# Patient Record
Sex: FEMALE | Race: WHITE | URBAN - METROPOLITAN AREA
[De-identification: names, ages, dates, MRNs, and addresses within clinical notes are randomized per-mention and may not be internally consistent; named-entity substitution may affect disease eponyms.]

---

## 2022-06-29 ENCOUNTER — APPOINTMENT (RX ONLY)
Dept: URBAN - METROPOLITAN AREA CLINIC 331 | Facility: CLINIC | Age: 67
Setting detail: DERMATOLOGY
End: 2022-06-29

## 2022-06-29 PROBLEM — D48.5 NEOPLASM OF UNCERTAIN BEHAVIOR OF SKIN: Status: ACTIVE | Noted: 2022-06-29

## 2022-06-29 PROCEDURE — ? BIOPSY BY SHAVE METHOD

## 2022-06-29 PROCEDURE — 11102 TANGNTL BX SKIN SINGLE LES: CPT

## 2022-07-29 ENCOUNTER — APPOINTMENT (RX ONLY)
Dept: URBAN - METROPOLITAN AREA CLINIC 331 | Facility: CLINIC | Age: 67
Setting detail: DERMATOLOGY
End: 2022-07-29

## 2022-07-29 PROBLEM — C44.311 BASAL CELL CARCINOMA OF SKIN OF NOSE: Status: ACTIVE | Noted: 2022-07-29

## 2022-07-29 PROCEDURE — ? PRESCRIPTION

## 2022-07-29 PROCEDURE — 17311 MOHS 1 STAGE H/N/HF/G: CPT

## 2022-07-29 PROCEDURE — ? MOHS SURGERY

## 2022-07-29 RX ORDER — MINOCYCLINE HYDROCHLORIDE 100 MG/1
CAPSULE ORAL
Qty: 7 | Refills: 0 | Status: ERX | COMMUNITY
Start: 2022-07-29

## 2022-07-29 RX ORDER — MUPIROCIN 20 MG/G
OINTMENT TOPICAL BID
Qty: 22 | Refills: 1 | Status: ERX | COMMUNITY
Start: 2022-07-29

## 2022-07-29 RX ADMIN — MINOCYCLINE HYDROCHLORIDE: 100 CAPSULE ORAL at 00:00

## 2022-07-29 RX ADMIN — MUPIROCIN: 20 OINTMENT TOPICAL at 00:00

## 2022-07-29 NOTE — PROCEDURE: MOHS SURGERY

## 2022-07-29 NOTE — PROCEDURE: MOHS SURGERY
We will have patient begin Singulair 10 mg at bedtime.  Recommend having chest x-ray done.  Referral to allergist.  Continue antihistamine of choice.  Decongestant of choice.  I am avoiding steroids as she is just completed a course.  Pt verbalizes understanding and agreement with plan of care.    Bi-Rhombic Flap Text: The defect edges were debeveled with a #15 scalpel blade.  Given the location of the defect and the proximity to free margins a bi-rhombic flap was deemed most appropriate.  Using a sterile surgical marker, an appropriate rhombic flap was drawn incorporating the defect. The area thus outlined was incised deep to adipose tissue with a #15 scalpel blade.  The skin margins were undermined to an appropriate distance in all directions utilizing iris scissors.

## 2022-07-29 NOTE — PROCEDURE: MOHS SURGERY
Fall precautions/Explanation of exam/test Cheiloplasty (Complex) Text: A decision was made to reconstruct the defect with a  cheiloplasty.  The defect was undermined extensively.  Additional orbicularis oris muscle was excised with a 15 blade scalpel.  The defect was converted into a full thickness wedge to facilite a better cosmetic result.  Small vessels were then tied off with 5-0 monocyrl. The orbicularis oris, superficial fascia, adipose and dermis were then reapproximated.  After the deeper layers were approximated the epidermis was reapproximated with particular care given to realign the vermilion border.

## 2022-08-10 ENCOUNTER — APPOINTMENT (RX ONLY)
Dept: URBAN - METROPOLITAN AREA CLINIC 331 | Facility: CLINIC | Age: 67
Setting detail: DERMATOLOGY
End: 2022-08-10

## 2022-08-10 DIAGNOSIS — R21 RASH AND OTHER NONSPECIFIC SKIN ERUPTION: ICD-10-CM

## 2022-08-10 DIAGNOSIS — D22 MELANOCYTIC NEVI: ICD-10-CM

## 2022-08-10 DIAGNOSIS — F42.4 EXCORIATION (SKIN-PICKING) DISORDER: ICD-10-CM

## 2022-08-10 DIAGNOSIS — L20.89 OTHER ATOPIC DERMATITIS: ICD-10-CM | Status: INADEQUATELY CONTROLLED

## 2022-08-10 DIAGNOSIS — L81.4 OTHER MELANIN HYPERPIGMENTATION: ICD-10-CM

## 2022-08-10 DIAGNOSIS — L82.1 OTHER SEBORRHEIC KERATOSIS: ICD-10-CM

## 2022-08-10 DIAGNOSIS — Z85.828 PERSONAL HISTORY OF OTHER MALIGNANT NEOPLASM OF SKIN: ICD-10-CM

## 2022-08-10 PROBLEM — D48.5 NEOPLASM OF UNCERTAIN BEHAVIOR OF SKIN: Status: ACTIVE | Noted: 2022-08-10

## 2022-08-10 PROBLEM — D22.5 MELANOCYTIC NEVI OF TRUNK: Status: ACTIVE | Noted: 2022-08-10

## 2022-08-10 PROBLEM — T14.8XXA OTHER INJURY OF UNSPECIFIED BODY REGION, INITIAL ENCOUNTER: Status: ACTIVE | Noted: 2022-08-10

## 2022-08-10 PROCEDURE — 99214 OFFICE O/P EST MOD 30 MIN: CPT | Mod: 25

## 2022-08-10 PROCEDURE — ? COUNSELING

## 2022-08-10 PROCEDURE — 11104 PUNCH BX SKIN SINGLE LESION: CPT

## 2022-08-10 PROCEDURE — ? TREATMENT REGIMEN

## 2022-08-10 PROCEDURE — ? PRESCRIPTION

## 2022-08-10 PROCEDURE — ? BIOPSY BY SHAVE METHOD

## 2022-08-10 PROCEDURE — 11103 TANGNTL BX SKIN EA SEP/ADDL: CPT

## 2022-08-10 PROCEDURE — ? BIOPSY BY PUNCH METHOD

## 2022-08-10 PROCEDURE — ? FULL BODY SKIN EXAM

## 2022-08-10 RX ORDER — TRIAMCINOLONE ACETONIDE 1 MG/G
CREAM TOPICAL BID
Qty: 454 | Refills: 3 | Status: CANCELLED | COMMUNITY
Start: 2022-08-10

## 2022-08-10 RX ADMIN — TRIAMCINOLONE ACETONIDE: 1 CREAM TOPICAL at 00:00

## 2022-08-10 ASSESSMENT — LOCATION ZONE DERM
LOCATION ZONE: TRUNK
LOCATION ZONE: FACE
LOCATION ZONE: FEET

## 2022-08-10 ASSESSMENT — LOCATION DETAILED DESCRIPTION DERM
LOCATION DETAILED: INFERIOR THORACIC SPINE
LOCATION DETAILED: RIGHT SUPERIOR MEDIAL MALAR CHEEK
LOCATION DETAILED: RIGHT SUPERIOR MEDIAL UPPER BACK
LOCATION DETAILED: LEFT DORSAL FOOT

## 2022-08-10 ASSESSMENT — LOCATION SIMPLE DESCRIPTION DERM
LOCATION SIMPLE: RIGHT UPPER BACK
LOCATION SIMPLE: LEFT FOOT
LOCATION SIMPLE: UPPER BACK
LOCATION SIMPLE: RIGHT CHEEK

## 2022-08-10 NOTE — PROCEDURE: COUNSELING
Detail Level: Generalized
Detail Level: Zone
Detail Level: Detailed
Patient Specific Counseling (Will Not Stick From Patient To Patient): Samples eucrisa given to Guest

## 2022-08-10 NOTE — PROCEDURE: BIOPSY BY PUNCH METHOD
Detail Level: Detailed
Was A Bandage Applied: Yes
Punch Size In Mm: 3
Biopsy Type: H and E
Anesthesia Type: 1% lidocaine with epinephrine
Anesthesia Volume In Cc (Will Not Render If 0): 0.5
Additional Anesthesia Volume In Cc (Will Not Render If 0): 0
Hemostasis: Gelfoam
Epidermal Sutures: 4-0 Ethilon
Wound Care: Petrolatum
Dressing: bandage
Patient Will Remove Sutures At Home?: No
Consent: Written consent was obtained and risks were reviewed including but not limited to scarring, infection, bleeding, scabbing, incomplete removal, nerve damage and allergy to anesthesia.
Post-Care Instructions: I reviewed with the patient in detail post-care instructions. Patient is to keep the biopsy site dry overnight, and then apply bacitracin twice daily until healed. Patient may apply hydrogen peroxide soaks to remove any crusting.
Home Suture Removal Text: Patient was provided a home suture removal kit and will remove their sutures at home.  If they have any questions or difficulties they will call the office.
Notification Instructions: Patient will be notified of biopsy results. However, patient instructed to call the office if not contacted within 2 weeks.
Billing Type: Third-Party Bill
Information: Selecting Yes will display possible errors in your note based on the variables you have selected. This validation is only offered as a suggestion for you. PLEASE NOTE THAT THE VALIDATION TEXT WILL BE REMOVED WHEN YOU FINALIZE YOUR NOTE. IF YOU WANT TO FAX A PRELIMINARY NOTE YOU WILL NEED TO TOGGLE THIS TO 'NO' IF YOU DO NOT WANT IT IN YOUR FAXED NOTE.

## 2022-08-10 NOTE — HPI: EVALUATION OF SKIN LESION(S)
What Type Of Note Output Would You Prefer (Optional)?: Bullet Format
Hpi Title: Evaluation of Skin Lesions
Additional History: \\n\\nPatient reports spot on upper back that is new and concerning.

## 2022-08-31 ENCOUNTER — APPOINTMENT (RX ONLY)
Dept: URBAN - METROPOLITAN AREA CLINIC 331 | Facility: CLINIC | Age: 67
Setting detail: DERMATOLOGY
End: 2022-08-31

## 2022-08-31 DIAGNOSIS — L20.89 OTHER ATOPIC DERMATITIS: ICD-10-CM

## 2022-08-31 DIAGNOSIS — L82.0 INFLAMED SEBORRHEIC KERATOSIS: ICD-10-CM

## 2022-08-31 DIAGNOSIS — L20.89 OTHER ATOPIC DERMATITIS: ICD-10-CM | Status: INADEQUATELY CONTROLLED

## 2022-08-31 DIAGNOSIS — F42.4 EXCORIATION (SKIN-PICKING) DISORDER: ICD-10-CM

## 2022-08-31 PROBLEM — T14.8XXA OTHER INJURY OF UNSPECIFIED BODY REGION, INITIAL ENCOUNTER: Status: ACTIVE | Noted: 2022-08-31

## 2022-08-31 PROBLEM — L20.84 INTRINSIC (ALLERGIC) ECZEMA: Status: ACTIVE | Noted: 2022-08-31

## 2022-08-31 PROCEDURE — ? PATHOLOGY DISCUSSION

## 2022-08-31 PROCEDURE — 99214 OFFICE O/P EST MOD 30 MIN: CPT

## 2022-08-31 PROCEDURE — ? COUNSELING

## 2022-08-31 PROCEDURE — ? PRESCRIPTION MEDICATION MANAGEMENT

## 2022-08-31 PROCEDURE — ? PRESCRIPTION

## 2022-08-31 RX ORDER — PHARMACY COMPOUNDING ACCESSORY
EACH MISCELLANEOUS
Qty: 60 | Refills: 3 | Status: ERX | COMMUNITY
Start: 2022-08-31

## 2022-08-31 RX ORDER — TRIAMCINOLONE ACETONIDE 1 MG/G
CREAM TOPICAL BID
Qty: 454 | Refills: 3 | Status: ACTIVE

## 2022-08-31 RX ORDER — PIMECROLIMUS 10 MG/G
CREAM TOPICAL
Qty: 60 | Refills: 3 | Status: ERX | COMMUNITY
Start: 2022-08-31

## 2022-08-31 RX ADMIN — PIMECROLIMUS: 10 CREAM TOPICAL at 00:00

## 2022-08-31 RX ADMIN — Medication: at 00:00

## 2022-08-31 ASSESSMENT — LOCATION ZONE DERM
LOCATION ZONE: EYELID
LOCATION ZONE: FEET
LOCATION ZONE: TRUNK

## 2022-08-31 ASSESSMENT — LOCATION SIMPLE DESCRIPTION DERM
LOCATION SIMPLE: RIGHT UPPER BACK
LOCATION SIMPLE: RIGHT FOOT
LOCATION SIMPLE: LEFT EYELID
LOCATION SIMPLE: LEFT FOOT

## 2022-08-31 ASSESSMENT — LOCATION DETAILED DESCRIPTION DERM
LOCATION DETAILED: RIGHT DORSAL FOOT
LOCATION DETAILED: RIGHT SUPERIOR MEDIAL UPPER BACK
LOCATION DETAILED: LEFT LATERAL CANTHUS
LOCATION DETAILED: LEFT DORSAL FOOT

## 2022-09-01 ENCOUNTER — RX ONLY (OUTPATIENT)
Age: 67
Setting detail: RX ONLY
End: 2022-09-01

## 2022-09-01 RX ORDER — TACROLIMUS 1 MG/G
OINTMENT TOPICAL
Qty: 30 | Refills: 3 | Status: ERX | COMMUNITY
Start: 2022-09-01

## 2022-09-22 ENCOUNTER — APPOINTMENT (RX ONLY)
Dept: URBAN - METROPOLITAN AREA CLINIC 331 | Facility: CLINIC | Age: 67
Setting detail: DERMATOLOGY
End: 2022-09-22

## 2022-09-22 DIAGNOSIS — K13.0 DISEASES OF LIPS: ICD-10-CM

## 2022-09-22 DIAGNOSIS — L20.89 OTHER ATOPIC DERMATITIS: ICD-10-CM

## 2022-09-22 PROBLEM — D48.5 NEOPLASM OF UNCERTAIN BEHAVIOR OF SKIN: Status: ACTIVE | Noted: 2022-09-22

## 2022-09-22 PROBLEM — L20.84 INTRINSIC (ALLERGIC) ECZEMA: Status: ACTIVE | Noted: 2022-09-22

## 2022-09-22 PROCEDURE — 11102 TANGNTL BX SKIN SINGLE LES: CPT

## 2022-09-22 PROCEDURE — ? PRESCRIPTION

## 2022-09-22 PROCEDURE — 99213 OFFICE O/P EST LOW 20 MIN: CPT | Mod: 25

## 2022-09-22 PROCEDURE — ? PRESCRIPTION MEDICATION MANAGEMENT

## 2022-09-22 PROCEDURE — ? COUNSELING

## 2022-09-22 PROCEDURE — ? BIOPSY BY SHAVE METHOD

## 2022-09-22 RX ORDER — DESONIDE 0.5 MG/G
OINTMENT TOPICAL
Qty: 60 | Refills: 3 | Status: ERX | COMMUNITY
Start: 2022-09-22

## 2022-09-22 RX ADMIN — DESONIDE: 0.5 OINTMENT TOPICAL at 00:00

## 2022-09-22 ASSESSMENT — LOCATION ZONE DERM
LOCATION ZONE: LIP
LOCATION ZONE: FEET

## 2022-09-22 ASSESSMENT — LOCATION SIMPLE DESCRIPTION DERM
LOCATION SIMPLE: RIGHT FOOT
LOCATION SIMPLE: LEFT LIP
LOCATION SIMPLE: LEFT FOOT

## 2022-09-22 ASSESSMENT — LOCATION DETAILED DESCRIPTION DERM
LOCATION DETAILED: RIGHT DORSAL FOOT
LOCATION DETAILED: LEFT DORSAL FOOT
LOCATION DETAILED: LEFT LOWER CUTANEOUS LIP

## 2022-09-22 NOTE — PROCEDURE: PRESCRIPTION MEDICATION MANAGEMENT
Detail Level: Zone
Render In Strict Bullet Format?: No
Continue Regimen: OTC Sarna, tacrolimus mixed with betamethasone x2 weeks on 1 week off alternating

## 2022-11-22 ENCOUNTER — APPOINTMENT (RX ONLY)
Dept: URBAN - METROPOLITAN AREA CLINIC 331 | Facility: CLINIC | Age: 67
Setting detail: DERMATOLOGY
End: 2022-11-22

## 2022-11-22 DIAGNOSIS — L20.89 OTHER ATOPIC DERMATITIS: ICD-10-CM

## 2022-11-22 DIAGNOSIS — K13.0 DISEASES OF LIPS: ICD-10-CM

## 2022-11-22 PROBLEM — L20.84 INTRINSIC (ALLERGIC) ECZEMA: Status: ACTIVE | Noted: 2022-11-22

## 2022-11-22 PROBLEM — L30.9 DERMATITIS, UNSPECIFIED: Status: ACTIVE | Noted: 2022-11-22

## 2022-11-22 PROCEDURE — 11104 PUNCH BX SKIN SINGLE LESION: CPT

## 2022-11-22 PROCEDURE — ? PRESCRIPTION

## 2022-11-22 PROCEDURE — 99214 OFFICE O/P EST MOD 30 MIN: CPT | Mod: 25

## 2022-11-22 PROCEDURE — ? DUPIXENT INITIATION

## 2022-11-22 PROCEDURE — ? ORDER TESTS

## 2022-11-22 PROCEDURE — ? COUNSELING

## 2022-11-22 PROCEDURE — ? PRESCRIPTION MEDICATION MANAGEMENT

## 2022-11-22 PROCEDURE — ? BIOPSY BY PUNCH METHOD

## 2022-11-22 RX ORDER — RUXOLITINIB 15 MG/G
CREAM TOPICAL 1
Qty: 60 | Refills: 3 | Status: ERX | COMMUNITY
Start: 2022-11-22

## 2022-11-22 RX ADMIN — RUXOLITINIB: 15 CREAM TOPICAL at 00:00

## 2022-11-22 ASSESSMENT — LOCATION DETAILED DESCRIPTION DERM
LOCATION DETAILED: RIGHT ANTERIOR SHOULDER
LOCATION DETAILED: RIGHT ANKLE
LOCATION DETAILED: MID TRAPEZIAL NECK
LOCATION DETAILED: RIGHT DORSAL FOOT
LOCATION DETAILED: LEFT ANTERIOR SHOULDER
LOCATION DETAILED: LEFT LOWER CUTANEOUS LIP
LOCATION DETAILED: LEFT DORSAL FOOT

## 2022-11-22 ASSESSMENT — LOCATION SIMPLE DESCRIPTION DERM
LOCATION SIMPLE: LEFT LIP
LOCATION SIMPLE: LEFT FOOT
LOCATION SIMPLE: TRAPEZIAL NECK
LOCATION SIMPLE: RIGHT ANKLE
LOCATION SIMPLE: LEFT SHOULDER
LOCATION SIMPLE: RIGHT FOOT
LOCATION SIMPLE: RIGHT SHOULDER

## 2022-11-22 ASSESSMENT — LOCATION ZONE DERM
LOCATION ZONE: FEET
LOCATION ZONE: LIP
LOCATION ZONE: NECK
LOCATION ZONE: ARM
LOCATION ZONE: LEG

## 2022-11-22 ASSESSMENT — BSA RASH: BSA RASH: 30

## 2022-11-22 NOTE — PROCEDURE: ORDER TESTS
Performing Laboratory: [id number]
Expected Date Of Service: 11/22/2022
Bill For Surgical Tray: no
Billing Type: Third-Party Bill

## 2022-11-22 NOTE — PROCEDURE: DUPIXENT INITIATION
Is Methotrexate Contraindicated?: No
Pregnancy And Lactation Warning Text: There have not been adverse fetal risks in women taking Dupixent while pregnant. It is unknown if this medication is excreted in breast milk.
Dupixent Dosing: 600 mg SC day 0 then 300 mg SC every other week
Dupixent Monitoring Guidelines: There is no laboratory monitoring requirement with Dupixent.
Diagnosis (Required): Atopic Dermatitis/Eczematous Dermatitis
Detail Level: Zone

## 2022-11-22 NOTE — PROCEDURE: PRESCRIPTION MEDICATION MANAGEMENT
Detail Level: Zone
Render In Strict Bullet Format?: No
Continue Regimen: OTC Sarna, tacrolimus mixed with betamethasone x2 weeks on 1 week off alternating
Samples Given: Opzelura

## 2022-11-30 ENCOUNTER — APPOINTMENT (RX ONLY)
Dept: URBAN - METROPOLITAN AREA CLINIC 331 | Facility: CLINIC | Age: 67
Setting detail: DERMATOLOGY
End: 2022-11-30

## 2022-11-30 DIAGNOSIS — L0390 CELLULITIS AND ABSCESS OF UNSPECIFIED SITES: ICD-10-CM | Status: INADEQUATELY CONTROLLED

## 2022-11-30 DIAGNOSIS — L08.9 LOCAL INFECTION OF THE SKIN AND SUBCUTANEOUS TISSUE, UNSPECIFIED: ICD-10-CM | Status: INADEQUATELY CONTROLLED

## 2022-11-30 DIAGNOSIS — L0391 CELLULITIS AND ABSCESS OF UNSPECIFIED SITES: ICD-10-CM | Status: INADEQUATELY CONTROLLED

## 2022-11-30 PROBLEM — L02.811 CUTANEOUS ABSCESS OF HEAD [ANY PART, EXCEPT FACE]: Status: ACTIVE | Noted: 2022-11-30

## 2022-11-30 PROCEDURE — ? PRESCRIPTION

## 2022-11-30 PROCEDURE — ? PRESCRIPTION MEDICATION MANAGEMENT

## 2022-11-30 PROCEDURE — ? INCISION AND DRAINAGE

## 2022-11-30 PROCEDURE — 10060 I&D ABSCESS SIMPLE/SINGLE: CPT

## 2022-11-30 PROCEDURE — 99214 OFFICE O/P EST MOD 30 MIN: CPT | Mod: 25

## 2022-11-30 PROCEDURE — ? ORDER TESTS

## 2022-11-30 PROCEDURE — ? COUNSELING

## 2022-11-30 RX ORDER — DOXYCYCLINE HYCLATE 100 MG/1
CAPSULE, GELATIN COATED ORAL BID
Qty: 14 | Refills: 0 | Status: ERX | COMMUNITY
Start: 2022-11-30

## 2022-11-30 RX ADMIN — DOXYCYCLINE HYCLATE: 100 CAPSULE, GELATIN COATED ORAL at 00:00

## 2022-11-30 ASSESSMENT — LOCATION DETAILED DESCRIPTION DERM: LOCATION DETAILED: LEFT SUPERIOR OCCIPITAL SCALP

## 2022-11-30 ASSESSMENT — LOCATION SIMPLE DESCRIPTION DERM: LOCATION SIMPLE: POSTERIOR SCALP

## 2022-11-30 ASSESSMENT — LOCATION ZONE DERM: LOCATION ZONE: SCALP

## 2022-11-30 NOTE — PROCEDURE: INCISION AND DRAINAGE
Detail Level: Detailed
Lesion Type: Abscess
Method: 15 blade
Curette: No
Anesthesia Type: 1% lidocaine with epinephrine
Size Of Lesion In Cm (Optional But May Be Required For Some Insurances): 0
Drainage Amount?: moderate
Wound Care: Petrolatum
Dressing: dry sterile dressing
Epidermal Sutures: 4-0 Ethilon
Epidermal Closure: simple interrupted
Suture Text: The incision was partially closed with
Preparation Text: The area was prepped in the usual clean fashion.
Curette Text (Optional): After the contents were expressed a curette was used to partially remove the cyst wall.
Consent was obtained and risks were reviewed including but not limited to delayed wound healing, infection, need for multiple I and D's, and pain.
Post-Care Instructions: I reviewed with the patient in detail post-care instructions. Patient should keep wound covered and call the office should any redness, pain, swelling or worsening occur.

## 2022-11-30 NOTE — PROCEDURE: ORDER TESTS
Billing Type: Third-Party Bill
Performing Laboratory: 0
Expected Date Of Service: 11/30/2022
Bill For Surgical Tray: no

## 2022-12-01 ENCOUNTER — RX ONLY (OUTPATIENT)
Age: 67
Setting detail: RX ONLY
End: 2022-12-01

## 2022-12-01 RX ORDER — PIMECROLIMUS 10 MG/G
1 CREAM TOPICAL BID
Qty: 60 | Refills: 0 | Status: ERX

## 2022-12-06 ENCOUNTER — APPOINTMENT (RX ONLY)
Dept: URBAN - METROPOLITAN AREA CLINIC 331 | Facility: CLINIC | Age: 67
Setting detail: DERMATOLOGY
End: 2022-12-06

## 2022-12-06 DIAGNOSIS — L0391 CELLULITIS AND ABSCESS OF UNSPECIFIED SITES: ICD-10-CM | Status: RESOLVED

## 2022-12-06 DIAGNOSIS — L0390 CELLULITIS AND ABSCESS OF UNSPECIFIED SITES: ICD-10-CM | Status: RESOLVED

## 2022-12-06 DIAGNOSIS — L20.89 OTHER ATOPIC DERMATITIS: ICD-10-CM

## 2022-12-06 PROBLEM — L20.84 INTRINSIC (ALLERGIC) ECZEMA: Status: ACTIVE | Noted: 2022-12-06

## 2022-12-06 PROBLEM — L02.811 CUTANEOUS ABSCESS OF HEAD [ANY PART, EXCEPT FACE]: Status: ACTIVE | Noted: 2022-12-06

## 2022-12-06 PROCEDURE — 99214 OFFICE O/P EST MOD 30 MIN: CPT | Mod: 24

## 2022-12-06 PROCEDURE — ? DUPIXENT INITIATION

## 2022-12-06 PROCEDURE — ? COUNSELING

## 2022-12-06 PROCEDURE — ? ADDITIONAL NOTES

## 2022-12-06 PROCEDURE — ? PRESCRIPTION MEDICATION MANAGEMENT

## 2022-12-06 ASSESSMENT — LOCATION DETAILED DESCRIPTION DERM
LOCATION DETAILED: LEFT DORSAL FOOT
LOCATION DETAILED: RIGHT DORSAL FOOT
LOCATION DETAILED: RIGHT ANTERIOR SHOULDER
LOCATION DETAILED: MID TRAPEZIAL NECK
LOCATION DETAILED: LEFT SUPERIOR OCCIPITAL SCALP
LOCATION DETAILED: RIGHT ANKLE
LOCATION DETAILED: LEFT ANTERIOR SHOULDER

## 2022-12-06 ASSESSMENT — LOCATION SIMPLE DESCRIPTION DERM
LOCATION SIMPLE: RIGHT ANKLE
LOCATION SIMPLE: LEFT FOOT
LOCATION SIMPLE: LEFT SHOULDER
LOCATION SIMPLE: POSTERIOR SCALP
LOCATION SIMPLE: RIGHT SHOULDER
LOCATION SIMPLE: TRAPEZIAL NECK
LOCATION SIMPLE: RIGHT FOOT

## 2022-12-06 ASSESSMENT — LOCATION ZONE DERM
LOCATION ZONE: LEG
LOCATION ZONE: SCALP
LOCATION ZONE: FEET
LOCATION ZONE: NECK
LOCATION ZONE: ARM

## 2022-12-06 NOTE — PROCEDURE: COUNSELING
Detail Level: Detailed
Patient Specific Counseling (Will Not Stick From Patient To Patient): Still waiting for dupixent approval

## 2022-12-06 NOTE — PROCEDURE: ADDITIONAL NOTES
Detail Level: Simple
Additional Notes: Labs for dupixent were reviewed and are stable. LDL is high, patient is being monitored by her PCP.
Render Risk Assessment In Note?: no

## 2022-12-28 ENCOUNTER — APPOINTMENT (RX ONLY)
Dept: URBAN - METROPOLITAN AREA CLINIC 331 | Facility: CLINIC | Age: 67
Setting detail: DERMATOLOGY
End: 2022-12-28

## 2022-12-28 DIAGNOSIS — L72.8 OTHER FOLLICULAR CYSTS OF THE SKIN AND SUBCUTANEOUS TISSUE: ICD-10-CM | Status: INADEQUATELY CONTROLLED

## 2022-12-28 PROCEDURE — ? COUNSELING

## 2022-12-28 PROCEDURE — 99213 OFFICE O/P EST LOW 20 MIN: CPT

## 2022-12-28 PROCEDURE — ? PRESCRIPTION

## 2022-12-28 RX ORDER — CLINDAMYCIN PHOSPHATE 10 MG/G
1 GEL TOPICAL BID
Qty: 60 | Refills: 1 | Status: ERX | COMMUNITY
Start: 2022-12-28

## 2022-12-28 RX ORDER — DOXYCYCLINE HYCLATE 100 MG/1
CAPSULE, GELATIN COATED ORAL BID
Qty: 28 | Refills: 0 | Status: ERX

## 2022-12-28 RX ADMIN — CLINDAMYCIN PHOSPHATE 1: 10 GEL TOPICAL at 00:00

## 2023-01-04 ENCOUNTER — APPOINTMENT (RX ONLY)
Dept: URBAN - METROPOLITAN AREA CLINIC 331 | Facility: CLINIC | Age: 68
Setting detail: DERMATOLOGY
End: 2023-01-04

## 2023-01-04 DIAGNOSIS — L20.89 OTHER ATOPIC DERMATITIS: ICD-10-CM | Status: INADEQUATELY CONTROLLED

## 2023-01-04 PROBLEM — L20.84 INTRINSIC (ALLERGIC) ECZEMA: Status: ACTIVE | Noted: 2023-01-04

## 2023-01-04 PROCEDURE — ? BIOLOGIC INJECTION TRAINING

## 2023-01-04 PROCEDURE — ? COUNSELING

## 2023-01-04 PROCEDURE — 99212 OFFICE O/P EST SF 10 MIN: CPT

## 2023-01-04 ASSESSMENT — LOCATION DETAILED DESCRIPTION DERM
LOCATION DETAILED: RIGHT ANTERIOR DISTAL THIGH
LOCATION DETAILED: LEFT ANTERIOR DISTAL THIGH

## 2023-01-04 ASSESSMENT — LOCATION ZONE DERM: LOCATION ZONE: LEG

## 2023-01-04 ASSESSMENT — LOCATION SIMPLE DESCRIPTION DERM
LOCATION SIMPLE: RIGHT THIGH
LOCATION SIMPLE: LEFT THIGH

## 2023-01-04 NOTE — PROCEDURE: BIOLOGIC INJECTION TRAINING
Tremfya Training Text: We discussed Tremfya injection.  I demonstrated how to clean the skin and administer the medication.
Taltz Training Text: We discussed Taltz injection.  I demonstrated how to clean the skin and administer the medication.
Dupixent Training Text: We discussed Dupixent injection.  I demonstrated how to clean the skin and administer the medication.
Stelara Training Text: We discussed Stelara injection.  I demonstrated how to clean the skin and administer the medication.
Cosentyx Training Text: We discussed Cosentyx injection.  I demonstrated how to clean the skin and administer the medication.
Siliq Training Text: We discussed Siliq injection.  I demonstrated how to clean the skin and administer the medication.
Skyrizi Training Text: We discussed Skyrizi injection.  I demonstrated how to clean the skin and administer the medication.
Humira Training Text: We discussed Humira injection.  I demonstrated how to clean the skin and administer the medication.
Simponi Training Text: We discussed Simponi injection.  I demonstrated how to clean the skin and administer the medication.
Who Did You Train: The Patient
Which Biologic Is The Patient Receiving Training For?: Dupixent
Ilumya Training Text: We discussed Ilumya injection.  I demonstrated how to clean the skin and administer the medication.
Enbrel Training Text: We discussed Enbrel injection.  I demonstrated how to clean the skin and administer the medication.
Detail Level: Simple
Cimzia Training Text: We discussed Cimzia injection.  I demonstrated how to clean the skin and administer the medication.

## 2023-03-01 ENCOUNTER — APPOINTMENT (RX ONLY)
Dept: URBAN - METROPOLITAN AREA CLINIC 331 | Facility: CLINIC | Age: 68
Setting detail: DERMATOLOGY
End: 2023-03-01

## 2023-03-01 DIAGNOSIS — L20.89 OTHER ATOPIC DERMATITIS: ICD-10-CM | Status: WELL CONTROLLED

## 2023-03-01 PROBLEM — L20.84 INTRINSIC (ALLERGIC) ECZEMA: Status: ACTIVE | Noted: 2023-03-01

## 2023-03-01 PROCEDURE — ? ADDITIONAL NOTES

## 2023-03-01 PROCEDURE — ? COUNSELING

## 2023-03-01 PROCEDURE — ? PRESCRIPTION MEDICATION MANAGEMENT

## 2023-03-01 PROCEDURE — 99214 OFFICE O/P EST MOD 30 MIN: CPT

## 2023-03-01 NOTE — PROCEDURE: ADDITIONAL NOTES
Render Risk Assessment In Note?: no
Additional Notes: Patient is aware that approval ends 3/17/2023. She states she has already sent in a new order and they asked her some additional questions. Office is awaiting approval update.\\n\\nProvider alone
Detail Level: Simple

## 2023-03-24 ENCOUNTER — RX ONLY (OUTPATIENT)
Age: 68
Setting detail: RX ONLY
End: 2023-03-24

## 2023-03-24 RX ORDER — DUPILUMAB 300 MG/2ML
INJECTION, SOLUTION SUBCUTANEOUS
Qty: 2 | Refills: 11 | Status: ERX | COMMUNITY
Start: 2023-03-24

## 2023-06-01 ENCOUNTER — APPOINTMENT (RX ONLY)
Dept: URBAN - METROPOLITAN AREA CLINIC 331 | Facility: CLINIC | Age: 68
Setting detail: DERMATOLOGY
End: 2023-06-01

## 2023-06-01 DIAGNOSIS — L20.89 OTHER ATOPIC DERMATITIS: ICD-10-CM

## 2023-06-01 PROBLEM — L20.84 INTRINSIC (ALLERGIC) ECZEMA: Status: ACTIVE | Noted: 2023-06-01

## 2023-06-01 PROCEDURE — ? COUNSELING

## 2023-06-01 PROCEDURE — 99214 OFFICE O/P EST MOD 30 MIN: CPT

## 2023-06-01 PROCEDURE — ? PRESCRIPTION

## 2023-06-01 PROCEDURE — ? PRESCRIPTION MEDICATION MANAGEMENT

## 2023-06-01 RX ORDER — CLOBETASOL PROPIONATE 0.5 MG/G
CREAM TOPICAL BID
Qty: 60 | Refills: 2 | Status: ERX | COMMUNITY
Start: 2023-06-01

## 2023-06-01 RX ADMIN — CLOBETASOL PROPIONATE: 0.5 CREAM TOPICAL at 00:00

## 2023-06-01 ASSESSMENT — LOCATION DETAILED DESCRIPTION DERM
LOCATION DETAILED: RIGHT DORSAL FOOT
LOCATION DETAILED: LEFT DORSAL FOOT

## 2023-06-01 ASSESSMENT — LOCATION SIMPLE DESCRIPTION DERM
LOCATION SIMPLE: RIGHT FOOT
LOCATION SIMPLE: LEFT FOOT

## 2023-06-01 ASSESSMENT — LOCATION ZONE DERM: LOCATION ZONE: FEET

## 2023-06-01 NOTE — PROCEDURE: PRESCRIPTION MEDICATION MANAGEMENT
Initiate Treatment: Clobetasol cream
Continue Regimen: Dupixent injections
Render In Strict Bullet Format?: No
Detail Level: Zone

## 2023-07-06 ENCOUNTER — APPOINTMENT (RX ONLY)
Dept: URBAN - METROPOLITAN AREA CLINIC 331 | Facility: CLINIC | Age: 68
Setting detail: DERMATOLOGY
End: 2023-07-06

## 2023-07-06 DIAGNOSIS — Z85.828 PERSONAL HISTORY OF OTHER MALIGNANT NEOPLASM OF SKIN: ICD-10-CM

## 2023-07-06 DIAGNOSIS — L81.4 OTHER MELANIN HYPERPIGMENTATION: ICD-10-CM

## 2023-07-06 DIAGNOSIS — L20.89 OTHER ATOPIC DERMATITIS: ICD-10-CM | Status: IMPROVED

## 2023-07-06 DIAGNOSIS — L82.1 OTHER SEBORRHEIC KERATOSIS: ICD-10-CM

## 2023-07-06 DIAGNOSIS — D22 MELANOCYTIC NEVI: ICD-10-CM

## 2023-07-06 PROBLEM — D22.5 MELANOCYTIC NEVI OF TRUNK: Status: ACTIVE | Noted: 2023-07-06

## 2023-07-06 PROBLEM — L20.84 INTRINSIC (ALLERGIC) ECZEMA: Status: ACTIVE | Noted: 2023-07-06

## 2023-07-06 PROCEDURE — 99214 OFFICE O/P EST MOD 30 MIN: CPT

## 2023-07-06 PROCEDURE — ? FULL BODY SKIN EXAM

## 2023-07-06 PROCEDURE — ? TREATMENT REGIMEN

## 2023-07-06 PROCEDURE — ? PRESCRIPTION MEDICATION MANAGEMENT

## 2023-07-06 PROCEDURE — ? COUNSELING

## 2023-07-06 ASSESSMENT — LOCATION DETAILED DESCRIPTION DERM
LOCATION DETAILED: INFERIOR THORACIC SPINE
LOCATION DETAILED: LEFT DORSAL FOOT
LOCATION DETAILED: RIGHT DORSAL FOOT
LOCATION DETAILED: RIGHT SUPERIOR MEDIAL UPPER BACK

## 2023-07-06 ASSESSMENT — LOCATION SIMPLE DESCRIPTION DERM
LOCATION SIMPLE: RIGHT UPPER BACK
LOCATION SIMPLE: LEFT FOOT
LOCATION SIMPLE: RIGHT FOOT
LOCATION SIMPLE: UPPER BACK

## 2023-07-06 ASSESSMENT — LOCATION ZONE DERM
LOCATION ZONE: TRUNK
LOCATION ZONE: FEET

## 2023-07-06 NOTE — HPI: EVALUATION OF SKIN LESION(S)
What Type Of Note Output Would You Prefer (Optional)?: Bullet Format
Hpi Title: Evaluation of Skin Lesions
Additional History: Spot on r hand

## 2023-07-06 NOTE — PROCEDURE: PRESCRIPTION MEDICATION MANAGEMENT
Continue Regimen: Dupixent injections\\n\\nClobetasol cream
Render In Strict Bullet Format?: No
Detail Level: Zone

## 2023-08-07 NOTE — PROCEDURE: MOHS SURGERY
Routed to nurse pool.     Plastic Surgeon Procedure Text (A): After obtaining clear surgical margins the patient was sent to plastics for surgical repair.  The patient understands they will receive post-surgical care and follow-up from the referring physician's office.

## 2024-01-05 ENCOUNTER — APPOINTMENT (RX ONLY)
Dept: URBAN - METROPOLITAN AREA CLINIC 331 | Facility: CLINIC | Age: 69
Setting detail: DERMATOLOGY
End: 2024-01-05

## 2024-01-05 DIAGNOSIS — L20.9 ATOPIC DERMATITIS, UNSPECIFIED: ICD-10-CM | Status: IMPROVED

## 2024-01-05 PROCEDURE — 99214 OFFICE O/P EST MOD 30 MIN: CPT

## 2024-01-05 PROCEDURE — ? COUNSELING

## 2024-01-05 PROCEDURE — ? PRESCRIPTION MEDICATION MANAGEMENT

## 2024-01-05 ASSESSMENT — ITCH NUMERIC RATING SCALE: HOW SEVERE IS YOUR ITCHING?: 0

## 2024-01-05 ASSESSMENT — LOCATION DETAILED DESCRIPTION DERM
LOCATION DETAILED: LEFT DORSAL FOOT
LOCATION DETAILED: RIGHT DORSAL FOOT
LOCATION DETAILED: POSTERIOR MID-PARIETAL SCALP

## 2024-01-05 ASSESSMENT — LOCATION SIMPLE DESCRIPTION DERM
LOCATION SIMPLE: LEFT FOOT
LOCATION SIMPLE: RIGHT FOOT
LOCATION SIMPLE: POSTERIOR SCALP

## 2024-01-05 ASSESSMENT — LOCATION ZONE DERM
LOCATION ZONE: FEET
LOCATION ZONE: SCALP

## 2024-01-05 ASSESSMENT — BSA ECZEMA: % BODY COVERED IN ECZEMA: 5

## 2024-02-16 ENCOUNTER — RX ONLY (OUTPATIENT)
Age: 69
Setting detail: RX ONLY
End: 2024-02-16

## 2024-02-16 RX ORDER — DUPILUMAB 300 MG/2ML
INJECTION, SOLUTION SUBCUTANEOUS
Qty: 2 | Refills: 11 | Status: ERX

## 2024-03-05 ENCOUNTER — APPOINTMENT (RX ONLY)
Dept: URBAN - METROPOLITAN AREA CLINIC 331 | Facility: CLINIC | Age: 69
Setting detail: DERMATOLOGY
End: 2024-03-05

## 2024-03-05 DIAGNOSIS — Z85.828 PERSONAL HISTORY OF OTHER MALIGNANT NEOPLASM OF SKIN: ICD-10-CM

## 2024-03-05 DIAGNOSIS — L20.9 ATOPIC DERMATITIS, UNSPECIFIED: ICD-10-CM | Status: WELL CONTROLLED

## 2024-03-05 DIAGNOSIS — L82.1 OTHER SEBORRHEIC KERATOSIS: ICD-10-CM

## 2024-03-05 DIAGNOSIS — D22 MELANOCYTIC NEVI: ICD-10-CM

## 2024-03-05 DIAGNOSIS — L81.4 OTHER MELANIN HYPERPIGMENTATION: ICD-10-CM

## 2024-03-05 PROBLEM — D22.5 MELANOCYTIC NEVI OF TRUNK: Status: ACTIVE | Noted: 2024-03-05

## 2024-03-05 PROCEDURE — 99214 OFFICE O/P EST MOD 30 MIN: CPT

## 2024-03-05 PROCEDURE — ? PRESCRIPTION MEDICATION MANAGEMENT

## 2024-03-05 PROCEDURE — ? COUNSELING

## 2024-03-05 PROCEDURE — ? FULL BODY SKIN EXAM

## 2024-03-05 PROCEDURE — ? ADDITIONAL NOTES

## 2024-03-05 PROCEDURE — ? ORDER TESTS

## 2024-03-05 PROCEDURE — ? TREATMENT REGIMEN

## 2024-03-05 ASSESSMENT — LOCATION ZONE DERM
LOCATION ZONE: TRUNK
LOCATION ZONE: FEET
LOCATION ZONE: SCALP

## 2024-03-05 ASSESSMENT — LOCATION SIMPLE DESCRIPTION DERM
LOCATION SIMPLE: RIGHT UPPER BACK
LOCATION SIMPLE: UPPER BACK
LOCATION SIMPLE: RIGHT FOOT
LOCATION SIMPLE: LEFT FOOT
LOCATION SIMPLE: POSTERIOR SCALP

## 2024-03-05 ASSESSMENT — BSA ECZEMA: % BODY COVERED IN ECZEMA: 5

## 2024-03-05 ASSESSMENT — LOCATION DETAILED DESCRIPTION DERM
LOCATION DETAILED: INFERIOR THORACIC SPINE
LOCATION DETAILED: POSTERIOR MID-PARIETAL SCALP
LOCATION DETAILED: LEFT DORSAL FOOT
LOCATION DETAILED: RIGHT SUPERIOR MEDIAL UPPER BACK
LOCATION DETAILED: RIGHT DORSAL FOOT

## 2024-03-05 NOTE — PROCEDURE: ORDER TESTS
Billing Type: Third-Party Bill
Expected Date Of Service: 03/05/2024
Performing Laboratory: -[id number]
Bill For Surgical Tray: no
Lab Facility: [id number]

## 2024-03-05 NOTE — PROCEDURE: ADDITIONAL NOTES
Additional Notes: Patient asked if Dupixent would be causing her leg and ankle pain. Explained that other should not be causing pain. Recommended that she see her primary. She said she has an appointment in a week.
Detail Level: Simple
Render Risk Assessment In Note?: no

## 2024-06-03 ENCOUNTER — APPOINTMENT (RX ONLY)
Dept: URBAN - METROPOLITAN AREA CLINIC 331 | Facility: CLINIC | Age: 69
Setting detail: DERMATOLOGY
End: 2024-06-03

## 2024-06-03 DIAGNOSIS — L20.9 ATOPIC DERMATITIS, UNSPECIFIED: ICD-10-CM | Status: INADEQUATELY CONTROLLED

## 2024-06-03 PROCEDURE — 99214 OFFICE O/P EST MOD 30 MIN: CPT

## 2024-06-03 PROCEDURE — ? PRESCRIPTION MEDICATION MANAGEMENT

## 2024-06-03 PROCEDURE — ? COUNSELING

## 2024-06-03 ASSESSMENT — LOCATION ZONE DERM
LOCATION ZONE: FEET
LOCATION ZONE: SCALP

## 2024-06-03 ASSESSMENT — LOCATION SIMPLE DESCRIPTION DERM
LOCATION SIMPLE: LEFT FOOT
LOCATION SIMPLE: POSTERIOR SCALP
LOCATION SIMPLE: RIGHT FOOT

## 2024-06-03 ASSESSMENT — LOCATION DETAILED DESCRIPTION DERM
LOCATION DETAILED: RIGHT DORSAL FOOT
LOCATION DETAILED: LEFT DORSAL FOOT
LOCATION DETAILED: POSTERIOR MID-PARIETAL SCALP

## 2024-06-03 NOTE — PROCEDURE: PRESCRIPTION MEDICATION MANAGEMENT
Render In Strict Bullet Format?: No
Detail Level: Zone
Plan: Stopped Dupixent due to adverse side effects. Continue clobetasol as needed. \\n\\nBloodwork has not been done yet. \\n\\nPatient asked if Dupixent could be causing hip pain going down to her knee. Explained this is unlikely. Patient would prefer to stop dupixent and go on topical therapy. Explained I recommend she see her PCP and have some imaging completed. Follow up in 1 month. \\nDupixent will go on hold.

## 2024-07-09 ENCOUNTER — APPOINTMENT (RX ONLY)
Dept: URBAN - METROPOLITAN AREA CLINIC 331 | Facility: CLINIC | Age: 69
Setting detail: DERMATOLOGY
End: 2024-07-09

## 2024-07-09 DIAGNOSIS — L20.9 ATOPIC DERMATITIS, UNSPECIFIED: ICD-10-CM | Status: WELL CONTROLLED

## 2024-07-09 PROCEDURE — 99214 OFFICE O/P EST MOD 30 MIN: CPT

## 2024-07-09 PROCEDURE — ? PRESCRIPTION MEDICATION MANAGEMENT

## 2024-07-09 PROCEDURE — ? COUNSELING

## 2024-07-09 ASSESSMENT — LOCATION ZONE DERM
LOCATION ZONE: SCALP
LOCATION ZONE: FEET

## 2024-07-09 ASSESSMENT — LOCATION SIMPLE DESCRIPTION DERM
LOCATION SIMPLE: RIGHT FOOT
LOCATION SIMPLE: LEFT FOOT
LOCATION SIMPLE: POSTERIOR SCALP

## 2024-07-09 NOTE — PROCEDURE: PRESCRIPTION MEDICATION MANAGEMENT
Continue Regimen: Continue using Clobetsol 1-2x a week as needed.
Render In Strict Bullet Format?: No
Detail Level: Zone
Plan: Stopped Dupixent due to adverse side effects(BODY ACHES). Continue clobetasol as needed. Patient reports she feels so much better of dupixent. Skin has maintained clear on clobetasol.\\n\\nReviewed Bloodwork with patient TB negative (photo taken of results, patient had on her on her phone.

## 2024-09-05 ENCOUNTER — APPOINTMENT (RX ONLY)
Dept: URBAN - METROPOLITAN AREA CLINIC 331 | Facility: CLINIC | Age: 69
Setting detail: DERMATOLOGY
End: 2024-09-05

## 2024-09-05 DIAGNOSIS — L82.1 OTHER SEBORRHEIC KERATOSIS: ICD-10-CM

## 2024-09-05 DIAGNOSIS — L20.9 ATOPIC DERMATITIS, UNSPECIFIED: ICD-10-CM

## 2024-09-05 DIAGNOSIS — Z85.828 PERSONAL HISTORY OF OTHER MALIGNANT NEOPLASM OF SKIN: ICD-10-CM

## 2024-09-05 DIAGNOSIS — D22 MELANOCYTIC NEVI: ICD-10-CM

## 2024-09-05 DIAGNOSIS — L57.0 ACTINIC KERATOSIS: ICD-10-CM

## 2024-09-05 DIAGNOSIS — L81.4 OTHER MELANIN HYPERPIGMENTATION: ICD-10-CM

## 2024-09-05 PROBLEM — D22.5 MELANOCYTIC NEVI OF TRUNK: Status: ACTIVE | Noted: 2024-09-05

## 2024-09-05 PROCEDURE — ? COUNSELING

## 2024-09-05 PROCEDURE — ? PRESCRIPTION MEDICATION MANAGEMENT

## 2024-09-05 PROCEDURE — 17003 DESTRUCT PREMALG LES 2-14: CPT

## 2024-09-05 PROCEDURE — ? FULL BODY SKIN EXAM

## 2024-09-05 PROCEDURE — 17000 DESTRUCT PREMALG LESION: CPT

## 2024-09-05 PROCEDURE — ? TREATMENT REGIMEN

## 2024-09-05 PROCEDURE — 99214 OFFICE O/P EST MOD 30 MIN: CPT | Mod: 25

## 2024-09-05 PROCEDURE — ? LIQUID NITROGEN

## 2024-09-05 ASSESSMENT — LOCATION ZONE DERM
LOCATION ZONE: TRUNK
LOCATION ZONE: NOSE
LOCATION ZONE: FACE

## 2024-09-05 ASSESSMENT — LOCATION DETAILED DESCRIPTION DERM
LOCATION DETAILED: LEFT NASAL SIDEWALL
LOCATION DETAILED: INFERIOR THORACIC SPINE
LOCATION DETAILED: LEFT NASAL ALA
LOCATION DETAILED: LEFT SUPERIOR CENTRAL BUCCAL CHEEK
LOCATION DETAILED: RIGHT SUPERIOR MEDIAL UPPER BACK
LOCATION DETAILED: RIGHT INFERIOR MEDIAL MALAR CHEEK

## 2024-09-05 ASSESSMENT — LOCATION SIMPLE DESCRIPTION DERM
LOCATION SIMPLE: RIGHT UPPER BACK
LOCATION SIMPLE: LEFT CHEEK
LOCATION SIMPLE: UPPER BACK
LOCATION SIMPLE: RIGHT CHEEK
LOCATION SIMPLE: LEFT NOSE

## 2024-09-05 NOTE — PROCEDURE: PRESCRIPTION MEDICATION MANAGEMENT
Render In Strict Bullet Format?: No
Continue Regimen: Clobetasol 0.05% topical cream.
Plan: Use clobetasol PRN; Remains off Dupixent due to body aches. Patient had an X-ray completed on knee, it showed small effusion and arthritic changes.
Detail Level: Zone

## 2024-09-05 NOTE — PROCEDURE: LIQUID NITROGEN
Duration Of Freeze Thaw-Cycle (Seconds): 0
Show Applicator Variable?: Yes
Detail Level: Detailed
Consent: The patient's consent was obtained including but not limited to risks of crusting, scabbing, blistering, scarring, darker or lighter pigmentary change, recurrence, incomplete removal and infection.
Render Post-Care Instructions In Note?: no
Post-Care Instructions: You have just had cryotherapy for the treatment of a pre-cancerous or other benign (non-cancerous) skin lesions. You can expect the pain to rapidly decrease over the next 45 minutes. The area treated will initially turn red and over the next 72 hours turn brown to black. A scab will form that will peel off by itself within 5 to 7 days. A blister or reddish purple blood blister may form where the area has been treated. When the scab forms, you can apply Vaseline or Scar Recovery Gel twice a day to the wound. This product will improve the healing of the wound, decreasing the appearance of red or pink pigment changes in the wound. The gel has also been shown to significantly decrease itching while the wound heals. You can purchase the Scar Recovery Gel at our office. \\n\\nCan I wash or use makeup? Yes\\n\\nShould I break the blister should one form? \\nIf the blister is bothersome, you may break the blister with a clean needle if the lesion is on the body. However, we do not recommend doing this for lesions on the face. Keep the area dry and as clean as possible in order to prevent infection. Natural skin is the best protection to prevent infection. \\n\\nWill this leave a scar? \\nIt could, but it is very unlikely. However, it may leave a slight discoloration, which should be temporary. \\n\\nWhat if the skin growth doesn't go away after this has healed? \\nThe providers treated this area believing it did not have cancerous roots and was strictly limited to the surface of the skin as a pre-cancerous or benign growth would be. The growth may not disappear or it may return over a period of time. You need to have this area checked again at your follow-up appointment to ensure that it does not need further treatment or that it has resolved.

## 2025-03-05 ENCOUNTER — APPOINTMENT (OUTPATIENT)
Dept: URBAN - METROPOLITAN AREA CLINIC 331 | Facility: CLINIC | Age: 70
Setting detail: DERMATOLOGY
End: 2025-03-05

## 2025-03-05 ENCOUNTER — RX ONLY (RX ONLY)
Age: 70
End: 2025-03-05

## 2025-03-05 DIAGNOSIS — L82.1 OTHER SEBORRHEIC KERATOSIS: ICD-10-CM

## 2025-03-05 DIAGNOSIS — D22 MELANOCYTIC NEVI: ICD-10-CM

## 2025-03-05 DIAGNOSIS — Z85.828 PERSONAL HISTORY OF OTHER MALIGNANT NEOPLASM OF SKIN: ICD-10-CM

## 2025-03-05 DIAGNOSIS — L81.4 OTHER MELANIN HYPERPIGMENTATION: ICD-10-CM

## 2025-03-05 DIAGNOSIS — L20.9 ATOPIC DERMATITIS, UNSPECIFIED: ICD-10-CM

## 2025-03-05 PROBLEM — D22.5 MELANOCYTIC NEVI OF TRUNK: Status: ACTIVE | Noted: 2025-03-05

## 2025-03-05 PROCEDURE — ? COUNSELING

## 2025-03-05 PROCEDURE — ? FULL BODY SKIN EXAM

## 2025-03-05 PROCEDURE — 99214 OFFICE O/P EST MOD 30 MIN: CPT

## 2025-03-05 PROCEDURE — ? PRESCRIPTION MEDICATION MANAGEMENT

## 2025-03-05 PROCEDURE — ? TREATMENT REGIMEN

## 2025-03-05 RX ORDER — CLOBETASOL PROPIONATE 0.5 MG/G
CREAM TOPICAL BID
Qty: 60 | Refills: 2 | Status: ERX | COMMUNITY
Start: 2025-03-05

## 2025-03-05 ASSESSMENT — LOCATION SIMPLE DESCRIPTION DERM
LOCATION SIMPLE: RIGHT UPPER BACK
LOCATION SIMPLE: UPPER BACK

## 2025-03-05 ASSESSMENT — LOCATION DETAILED DESCRIPTION DERM
LOCATION DETAILED: INFERIOR THORACIC SPINE
LOCATION DETAILED: RIGHT SUPERIOR MEDIAL UPPER BACK

## 2025-03-05 ASSESSMENT — LOCATION ZONE DERM: LOCATION ZONE: TRUNK

## 2025-03-05 NOTE — PROCEDURE: PRESCRIPTION MEDICATION MANAGEMENT
Render In Strict Bullet Format?: No
Continue Regimen: Clobetasol 0.05% topical cream; twice daily x2 weeks.
Plan: Remains off Dupixent due to body aches. Patient had an X-ray completed on knee, it showed small effusion and arthritic changes.\\n\\nDiscussed Adbry
Detail Level: Zone

## 2025-03-26 ENCOUNTER — APPOINTMENT (OUTPATIENT)
Dept: URBAN - METROPOLITAN AREA CLINIC 331 | Facility: CLINIC | Age: 70
Setting detail: DERMATOLOGY
End: 2025-03-26

## 2025-03-26 DIAGNOSIS — L20.9 ATOPIC DERMATITIS, UNSPECIFIED: ICD-10-CM

## 2025-03-26 PROCEDURE — ? PRESCRIPTION MEDICATION MANAGEMENT

## 2025-03-26 PROCEDURE — 99214 OFFICE O/P EST MOD 30 MIN: CPT

## 2025-03-26 PROCEDURE — ? PRESCRIPTION

## 2025-03-26 PROCEDURE — ? COUNSELING

## 2025-03-26 RX ORDER — EMOLLIENT COMBINATION NO.32
1 EMULSION, EXTENDED RELEASE TOPICAL BID
Qty: 90 | Refills: 2 | Status: ERX | COMMUNITY
Start: 2025-03-26

## 2025-03-26 RX ADMIN — Medication 1: at 00:00

## 2025-03-26 NOTE — PROCEDURE: PRESCRIPTION MEDICATION MANAGEMENT
Continue Regimen: Continue Clobetasol topical cream 2x daily for 2 weeks, then as needed for flares.
Initiate Treatment: Start applying EpiCeram 2-3x daily.
Detail Level: Zone
Plan: Remains off Dupixent due to body aches. Patient had an X-ray completed on knee, it showed small effusion and arthritic changes.\\n\\nDiscussed Adbry.
Render In Strict Bullet Format?: No

## 2025-05-27 ENCOUNTER — APPOINTMENT (OUTPATIENT)
Dept: URBAN - METROPOLITAN AREA CLINIC 331 | Facility: CLINIC | Age: 70
Setting detail: DERMATOLOGY
End: 2025-05-27

## 2025-05-27 DIAGNOSIS — L20.9 ATOPIC DERMATITIS, UNSPECIFIED: ICD-10-CM

## 2025-05-27 PROCEDURE — ? COUNSELING

## 2025-05-27 PROCEDURE — 99214 OFFICE O/P EST MOD 30 MIN: CPT

## 2025-05-27 PROCEDURE — ? PRESCRIPTION MEDICATION MANAGEMENT

## 2025-05-27 NOTE — PROCEDURE: PRESCRIPTION MEDICATION MANAGEMENT
Render In Strict Bullet Format?: No
Continue Regimen: Clobetasol 0.05% topical cream and Epiceram as needed.
Plan: Remains off Dupixent due to body aches. Patient had an X-ray completed on knee, it showed small effusion and arthritic changes.\\n\\nPlan to treat with Nathen inhibitor if topicals stop working. Labs required prior to starting. \\n
Detail Level: Zone
Initiate Treatment: Use Vaseline with socks twice a week.

## 2025-08-05 ENCOUNTER — APPOINTMENT (OUTPATIENT)
Dept: URBAN - METROPOLITAN AREA CLINIC 331 | Facility: CLINIC | Age: 70
Setting detail: DERMATOLOGY
End: 2025-08-05

## 2025-08-05 DIAGNOSIS — L81.4 OTHER MELANIN HYPERPIGMENTATION: ICD-10-CM

## 2025-08-05 DIAGNOSIS — D22 MELANOCYTIC NEVI: ICD-10-CM

## 2025-08-05 DIAGNOSIS — L82.1 OTHER SEBORRHEIC KERATOSIS: ICD-10-CM

## 2025-08-05 DIAGNOSIS — Z85.828 PERSONAL HISTORY OF OTHER MALIGNANT NEOPLASM OF SKIN: ICD-10-CM

## 2025-08-05 DIAGNOSIS — L20.9 ATOPIC DERMATITIS, UNSPECIFIED: ICD-10-CM | Status: INADEQUATELY CONTROLLED

## 2025-08-05 PROBLEM — D48.5 NEOPLASM OF UNCERTAIN BEHAVIOR OF SKIN: Status: ACTIVE | Noted: 2025-08-05

## 2025-08-05 PROBLEM — D22.5 MELANOCYTIC NEVI OF TRUNK: Status: ACTIVE | Noted: 2025-08-05

## 2025-08-05 PROCEDURE — ? FULL BODY SKIN EXAM

## 2025-08-05 PROCEDURE — ? BIOPSY BY SHAVE METHOD

## 2025-08-05 PROCEDURE — ? COUNSELING

## 2025-08-05 PROCEDURE — ? TREATMENT REGIMEN

## 2025-08-05 PROCEDURE — ? PRESCRIPTION MEDICATION MANAGEMENT

## 2025-08-05 ASSESSMENT — LOCATION SIMPLE DESCRIPTION DERM
LOCATION SIMPLE: RIGHT ANKLE
LOCATION SIMPLE: UPPER BACK
LOCATION SIMPLE: RIGHT UPPER BACK
LOCATION SIMPLE: LEFT ANKLE

## 2025-08-05 ASSESSMENT — LOCATION DETAILED DESCRIPTION DERM
LOCATION DETAILED: LEFT ANKLE
LOCATION DETAILED: RIGHT SUPERIOR MEDIAL UPPER BACK
LOCATION DETAILED: INFERIOR THORACIC SPINE
LOCATION DETAILED: RIGHT ANKLE

## 2025-08-05 ASSESSMENT — LOCATION ZONE DERM
LOCATION ZONE: LEG
LOCATION ZONE: TRUNK